# Patient Record
Sex: MALE | Race: WHITE | Employment: FULL TIME | URBAN - METROPOLITAN AREA
[De-identification: names, ages, dates, MRNs, and addresses within clinical notes are randomized per-mention and may not be internally consistent; named-entity substitution may affect disease eponyms.]

---

## 2020-03-06 ENCOUNTER — HOSPITAL ENCOUNTER (EMERGENCY)
Age: 40
Discharge: HOME | End: 2020-03-06
Attending: EMERGENCY MEDICINE
Payer: COMMERCIAL

## 2020-03-06 VITALS
RESPIRATION RATE: 18 BRPM | SYSTOLIC BLOOD PRESSURE: 128 MMHG | OXYGEN SATURATION: 97 % | DIASTOLIC BLOOD PRESSURE: 69 MMHG | HEART RATE: 69 BPM | TEMPERATURE: 97.7 F

## 2020-03-06 DIAGNOSIS — S50.852A FOREIGN BODY IN LEFT FOREARM, INITIAL ENCOUNTER: Primary | ICD-10-CM

## 2020-03-06 PROCEDURE — 74011000250 HC RX REV CODE- 250: Performed by: EMERGENCY MEDICINE

## 2020-03-06 PROCEDURE — 96372 THER/PROPH/DIAG INJ SC/IM: CPT

## 2020-03-06 PROCEDURE — 90715 TDAP VACCINE 7 YRS/> IM: CPT | Performed by: EMERGENCY MEDICINE

## 2020-03-06 PROCEDURE — 90471 IMMUNIZATION ADMIN: CPT

## 2020-03-06 PROCEDURE — 74011250636 HC RX REV CODE- 250/636: Performed by: EMERGENCY MEDICINE

## 2020-03-06 PROCEDURE — 99283 EMERGENCY DEPT VISIT LOW MDM: CPT

## 2020-03-06 RX ORDER — BACITRACIN 500 UNIT/G
1 PACKET (EA) TOPICAL
Status: COMPLETED | OUTPATIENT
Start: 2020-03-06 | End: 2020-03-06

## 2020-03-06 RX ORDER — LIDOCAINE HYDROCHLORIDE AND EPINEPHRINE 10; 10 MG/ML; UG/ML
1.5 INJECTION, SOLUTION INFILTRATION; PERINEURAL ONCE
Status: COMPLETED | OUTPATIENT
Start: 2020-03-06 | End: 2020-03-06

## 2020-03-06 RX ADMIN — TETANUS TOXOID, REDUCED DIPHTHERIA TOXOID AND ACELLULAR PERTUSSIS VACCINE, ADSORBED 0.5 ML: 5; 2.5; 8; 8; 2.5 SUSPENSION INTRAMUSCULAR at 16:31

## 2020-03-06 RX ADMIN — BACITRACIN 1 PACKET: 500 OINTMENT TOPICAL at 16:33

## 2020-03-06 RX ADMIN — LIDOCAINE HYDROCHLORIDE AND EPINEPHRINE 15 MG: 10; 10 INJECTION, SOLUTION INFILTRATION; PERINEURAL at 16:31

## 2020-03-06 NOTE — ED NOTES
Pt verbalizes knowledge of dc instructions, denies further questions. Pt ambulated with steady gait, denies pain, NAD.

## 2020-03-06 NOTE — ED PROVIDER NOTES
Within the past hour, while at work, this patient was working in a boat when his left forearm grazed across a fishhook. The fishhook entered the medial aspect of his distal left forearm. It lodged there. He has 5/10 pain. It is worse when the lure is moved. He presents to take care of this foreign body in his arm. No history of diabetes, HIV or hepatitis. His last tetanus shot was 7 years ago. This lure and hook have been in the water before. No recent issues with illnesses. No other issues today. No distal numbness or tingling in his left hand. No past medical history on file. Past Surgical History:   Procedure Laterality Date    HX APPENDECTOMY           No family history on file.     Social History     Socioeconomic History    Marital status:      Spouse name: Not on file    Number of children: Not on file    Years of education: Not on file    Highest education level: Not on file   Occupational History    Not on file   Social Needs    Financial resource strain: Not on file    Food insecurity:     Worry: Not on file     Inability: Not on file    Transportation needs:     Medical: Not on file     Non-medical: Not on file   Tobacco Use    Smoking status: Current Some Day Smoker    Smokeless tobacco: Never Used   Substance and Sexual Activity    Alcohol use: Not Currently    Drug use: Never    Sexual activity: Not on file   Lifestyle    Physical activity:     Days per week: Not on file     Minutes per session: Not on file    Stress: Not on file   Relationships    Social connections:     Talks on phone: Not on file     Gets together: Not on file     Attends Christianity service: Not on file     Active member of club or organization: Not on file     Attends meetings of clubs or organizations: Not on file     Relationship status: Not on file    Intimate partner violence:     Fear of current or ex partner: Not on file     Emotionally abused: Not on file     Physically abused: Not on file     Forced sexual activity: Not on file   Other Topics Concern    Not on file   Social History Narrative    Not on file         ALLERGIES: Patient has no known allergies. Review of Systems    Vitals:    03/06/20 1559 03/06/20 1640   BP: (!) 154/100 128/69   Pulse: (!) 58 69   Resp: 20 18   Temp: 97.9 °F (36.6 °C) 97.7 °F (36.5 °C)   SpO2: 98% 97%            Physical Exam  Constitutional:       Appearance: Normal appearance. Cardiovascular:      Rate and Rhythm: Normal rate and regular rhythm. Pulses: Normal pulses. Pulmonary:      Effort: Pulmonary effort is normal.   Musculoskeletal:        Arms:    Skin:     General: Skin is warm and dry. Neurological:      General: No focal deficit present. Mental Status: He is alert. Comments: No focal motor or sensory deficits in the left hand. Psychiatric:         Mood and Affect: Mood normal.          MDM       Procedures      I gave the patient 10 cc of lidocaine with epinephrine for local anesthesia. After the anesthesia had taken completely, I cut the lure with some bolt cutters. I then tried to back the fishhook out without success. I then brought the fishhook forward and brought it through the skin at another site and completely was able to remove the fishhook. He tolerated the procedure well. No incision was made. We will clean the wound and bandage it. He will be given a tetanus shot. No antibiotics for now. Return for any signs of infection.

## 2020-03-06 NOTE — DISCHARGE INSTRUCTIONS
Patient Education        Object in the Skin: Care Instructions  Your Care Instructions  Small objects (splinters) of wood, metal, glass, or plastic can become embedded in the skin. Thorns from [employer] and other plants also can prick or become stuck in the skin. Splinters can cause an infection if they are not removed. Your doctor probably removed the object and cleaned the skin well. Your doctor may have given you antibiotics to prevent infection and a tetanus shot if you had not had one in the last 5 years or do not know when you had your last one. For a few days, you may have pain and itching in the wound where the object was removed. Follow-up care is a key part of your treatment and safety. Be sure to make and go to all appointments, and call your doctor if you are having problems. It's also a good idea to know your test results and keep a list of the medicines you take. How can you care for yourself at home? · If your doctor told you how to care for your wound, follow your doctor's instructions. If you did not get instructions, follow this general advice:  ? Wash the wound with clean water 2 times a day. Don't use hydrogen peroxide or alcohol, which can slow healing. ? You may cover the wound with a thin layer of petroleum jelly, such as Vaseline, and a nonstick bandage. ? Apply more petroleum jelly and replace the bandage as needed. · Your doctor may have used medicine to numb your skin. When it wears off, your pain may return. Take an over-the-counter pain medicine, such as acetaminophen (Tylenol), ibuprofen (Advil, Motrin), or naproxen (Aleve). Read and follow all instructions on the label. · Do not take two or more pain medicines at the same time unless the doctor told you to. Many pain medicines have acetaminophen, which is Tylenol. Too much acetaminophen (Tylenol) can be harmful. · If your doctor prescribed antibiotics, take them as directed. Do not stop taking them just because you feel better. You need to take the full course of antibiotics. · After 2 or 3 days, if your swelling is gone, apply a heating pad set on low or a warm cloth to your wound area. Some doctors suggest that you go back and forth between hot and cold. Put a thin cloth between the heating pad and your skin. · It may help to prop up the affected part of your body on a pillow anytime you sit or lie down during the next 3 days. Try to keep it above the level of your heart. This will help reduce swelling. · Your wound may itch or feel irritated. A little redness and swelling is normal. Do not scratch or rub the wound. When should you call for help? Call your doctor now or seek immediate medical care if:    · The skin near the wound is cool or pale or changes color.     · You have tingling, weakness, or numbness in the area near the wound.     · The wound starts to bleed, and blood soaks the bandage. Oozing small amounts of blood is normal.     · You have trouble moving a limb near the wound.     · You have signs of infection, such as:  ? Increased pain, swelling, warmth, or redness. ? Red streaks leading from the wound. ? Pus draining from the wound. ? A fever.    Watch closely for changes in your health, and be sure to contact your doctor if:    · You do not get better as expected. Where can you learn more? Go to http://farhana-randi.info/. Enter Jose Luis Coello in the search box to learn more about \"Object in the Skin: Care Instructions. \"  Current as of: June 26, 2019  Content Version: 12.2  © 8592-8974 [employer]. Care instructions adapted under license by [employer] (which disclaims liability or warranty for this information). If you have questions about a medical condition or this instruction, always ask your healthcare professional. [address] any warranty or liability for your use of this information.